# Patient Record
Sex: MALE | URBAN - METROPOLITAN AREA
[De-identification: names, ages, dates, MRNs, and addresses within clinical notes are randomized per-mention and may not be internally consistent; named-entity substitution may affect disease eponyms.]

---

## 2018-12-18 ENCOUNTER — OFFICE VISIT (OUTPATIENT)
Dept: URBAN - METROPOLITAN AREA CLINIC 76 | Facility: CLINIC | Age: 68
End: 2018-12-18
Payer: MEDICARE

## 2018-12-18 DIAGNOSIS — H35.3131 NONEXUDATIVE AGE-RELATED MACULAR DEGENERATION, BILATERAL, EARLY DRY STAGE: ICD-10-CM

## 2018-12-18 PROCEDURE — 99204 OFFICE O/P NEW MOD 45 MIN: CPT | Performed by: OPTOMETRIST

## 2018-12-18 ASSESSMENT — KERATOMETRY
OD: 43.00
OS: 43.25

## 2018-12-18 ASSESSMENT — INTRAOCULAR PRESSURE
OS: 12
OD: 14

## 2018-12-18 NOTE — IMPRESSION/PLAN
Impression: Nonexudative age-related macular degeneration, bilateral, early dry stage: H35.3131. OU. Plan: Discussed diagnosis in detail with patient. Counseling given about the benefits and/or risks of the Age-Related Eye Disease Study (AREDS) formulation for preventing progression of age-related macular degeneration (AMD). Add lutein 20-30 mg, zeaxanthin 2-5mg per day. Will continue to observe condition and or symptoms. Call if [address] worsens. Dispensed and explained use of [employer] grid.

## 2019-10-01 ENCOUNTER — OFFICE VISIT (OUTPATIENT)
Dept: URBAN - METROPOLITAN AREA CLINIC 76 | Facility: CLINIC | Age: 69
End: 2019-10-01
Payer: MEDICARE

## 2019-10-01 DIAGNOSIS — H25.13 AGE-RELATED NUCLEAR CATARACT, BILATERAL: Primary | ICD-10-CM

## 2019-10-01 PROCEDURE — 92004 COMPRE OPH EXAM NEW PT 1/>: CPT | Performed by: OPHTHALMOLOGY

## 2019-10-01 ASSESSMENT — INTRAOCULAR PRESSURE
OD: 14
OS: 15

## 2019-10-01 ASSESSMENT — KERATOMETRY
OS: 43.50
OD: 42.75

## 2019-10-01 NOTE — IMPRESSION/PLAN
Impression: Dry eye syndrome of bilateral lacrimal glands: H04.123. OU. Plan: Explained condition does not have a cure and will need artificial tears for maintenance.

## 2019-10-01 NOTE — IMPRESSION/PLAN
Impression: Open angle with borderline findings, low risk, bilateral: H40.013. OU. optic nerve appearance. IOP OU ok today. Plan: Discussed diagnosis in detail with patient. Recommend getting baseline glaucoma testing.

## 2019-12-31 ENCOUNTER — OFFICE VISIT (OUTPATIENT)
Dept: URBAN - METROPOLITAN AREA CLINIC 76 | Facility: CLINIC | Age: 69
End: 2019-12-31
Payer: MEDICARE

## 2019-12-31 DIAGNOSIS — H04.123 DRY EYE SYNDROME OF BILATERAL LACRIMAL GLANDS: ICD-10-CM

## 2019-12-31 PROCEDURE — 76514 ECHO EXAM OF EYE THICKNESS: CPT | Performed by: OPHTHALMOLOGY

## 2019-12-31 PROCEDURE — 99213 OFFICE O/P EST LOW 20 MIN: CPT | Performed by: OPHTHALMOLOGY

## 2019-12-31 PROCEDURE — 92083 EXTENDED VISUAL FIELD XM: CPT | Performed by: OPHTHALMOLOGY

## 2019-12-31 PROCEDURE — 92133 CPTRZD OPH DX IMG PST SGM ON: CPT | Performed by: OPHTHALMOLOGY

## 2019-12-31 ASSESSMENT — INTRAOCULAR PRESSURE
OD: 15
OS: 16

## 2019-12-31 NOTE — IMPRESSION/PLAN
Impression: Open angle with borderline findings, low risk, bilateral: H40.013. OU. optic nerve appearance. IOP OU ok today. Baseline VF and OCT testing reviewed Plan: Continue to monitor. No treatment recommended at this time.

## 2020-06-30 ENCOUNTER — OFFICE VISIT (OUTPATIENT)
Dept: URBAN - METROPOLITAN AREA CLINIC 76 | Facility: CLINIC | Age: 70
End: 2020-06-30
Payer: MEDICARE

## 2020-06-30 PROCEDURE — 99213 OFFICE O/P EST LOW 20 MIN: CPT | Performed by: OPHTHALMOLOGY

## 2020-06-30 ASSESSMENT — INTRAOCULAR PRESSURE
OS: 14
OD: 14

## 2020-06-30 NOTE — IMPRESSION/PLAN
Impression: Open angle with borderline findings, low risk, bilateral: H40.013. OU. optic nerve appearance. IOP OU ok today. Plan: Continue to monitor. No treatment recommended at this time. Needs updated tests.

## 2021-03-23 ENCOUNTER — OFFICE VISIT (OUTPATIENT)
Dept: URBAN - METROPOLITAN AREA CLINIC 76 | Facility: CLINIC | Age: 71
End: 2021-03-23
Payer: MEDICARE

## 2021-03-23 DIAGNOSIS — H43.813 VITREOUS DEGENERATION, BILATERAL: ICD-10-CM

## 2021-03-23 PROCEDURE — 99213 OFFICE O/P EST LOW 20 MIN: CPT | Performed by: OPTOMETRIST

## 2021-03-23 PROCEDURE — 92083 EXTENDED VISUAL FIELD XM: CPT | Performed by: OPTOMETRIST

## 2021-03-23 PROCEDURE — 92133 CPTRZD OPH DX IMG PST SGM ON: CPT | Performed by: OPTOMETRIST

## 2021-03-23 RX ORDER — LATANOPROST 50 UG/ML
0.005 % SOLUTION OPHTHALMIC
Qty: 2.5 | Refills: 0 | Status: INACTIVE
Start: 2021-03-23 | End: 2021-04-14

## 2021-03-23 ASSESSMENT — INTRAOCULAR PRESSURE
OS: 11
OD: 13

## 2021-03-23 NOTE — IMPRESSION/PLAN
Impression: Open angle with borderline findings, low risk, bilateral: H40.013. OU. optic nerve appearance. IOP OU ok today. VF shows normal OD and possible early nasal step OS. OCT shows mild NFL thinning inferior > superior, OS normal. Plan: Continue to monitor. Explained correlation between sleep apnea and glaucoma and strongly recommend pt get a sleep study. Will send letter to PCP to consider ordering sleep study. Start Latanoprost QHS OU.

## 2021-04-20 ENCOUNTER — OFFICE VISIT (OUTPATIENT)
Dept: URBAN - METROPOLITAN AREA CLINIC 76 | Facility: CLINIC | Age: 71
End: 2021-04-20
Payer: MEDICARE

## 2021-04-20 PROCEDURE — 99212 OFFICE O/P EST SF 10 MIN: CPT | Performed by: OPTOMETRIST

## 2021-04-20 ASSESSMENT — INTRAOCULAR PRESSURE
OD: 9
OS: 8

## 2021-04-20 NOTE — IMPRESSION/PLAN
Impression: Open angle with borderline findings, low risk, bilateral: H40.013. OU. optic nerve appearance. IOP OU good today. Plan: Continue to monitor. Continue Latanoprost QHS OU. Keep scheduled sleep study.

## 2021-08-26 ENCOUNTER — OFFICE VISIT (OUTPATIENT)
Dept: URBAN - METROPOLITAN AREA CLINIC 76 | Facility: CLINIC | Age: 71
End: 2021-08-26
Payer: MEDICARE

## 2021-08-26 DIAGNOSIS — H40.013 OPEN ANGLE WITH BORDERLINE FINDINGS, LOW RISK, BILATERAL: Primary | ICD-10-CM

## 2021-08-26 PROCEDURE — 99214 OFFICE O/P EST MOD 30 MIN: CPT | Performed by: OPTOMETRIST

## 2021-08-26 ASSESSMENT — INTRAOCULAR PRESSURE
OD: 10
OS: 9

## 2021-08-26 NOTE — IMPRESSION/PLAN
Impression: Open angle with borderline findings, low risk, bilateral: H40.013. OU. IOP  WNL/Stable OU. Pt had sleep study done, mild sleep apnea. Pt is currently using a CPAP machine  Plan: Continue to monitor. Continue Latanoprost QHS OU.  RTC 6 months DE/VF/OCT IOP

## 2022-03-02 ENCOUNTER — OFFICE VISIT (OUTPATIENT)
Dept: URBAN - METROPOLITAN AREA CLINIC 76 | Facility: CLINIC | Age: 72
End: 2022-03-02
Payer: MEDICARE

## 2022-03-02 PROCEDURE — 92083 EXTENDED VISUAL FIELD XM: CPT | Performed by: OPTOMETRIST

## 2022-03-02 PROCEDURE — 92133 CPTRZD OPH DX IMG PST SGM ON: CPT | Performed by: OPTOMETRIST

## 2022-03-02 PROCEDURE — 99214 OFFICE O/P EST MOD 30 MIN: CPT | Performed by: OPTOMETRIST

## 2022-03-02 ASSESSMENT — INTRAOCULAR PRESSURE
OD: 10
OS: 10

## 2022-03-02 NOTE — IMPRESSION/PLAN
Impression: Age-related nuclear cataract, bilateral: H25.13. OU. Bilateral. Plan: Discussed condition. Continue to monitor without treatment at this time.

## 2022-03-02 NOTE — IMPRESSION/PLAN
Impression: Open angle with borderline findings, low risk, bilateral: H40.013. OU. IOP  WNL/Stable OU. Pt had sleep study done, mild sleep apnea. Pt is currently using a CPAP machine. Preformed and reviewed VF and RNFL OCT. VF normal OU. RNFL OCT OD: mild NFL thinning sup > inf, stable; OS: mild NFL thinning sup and inf, stable. Bilateral. Plan: Discussed with patient Continue to monitor. Continue Latanoprost QHS OU.

## 2022-03-10 ENCOUNTER — OFFICE VISIT (OUTPATIENT)
Dept: URBAN - METROPOLITAN AREA CLINIC 76 | Facility: CLINIC | Age: 72
End: 2022-03-10

## 2022-03-10 DIAGNOSIS — H52.4 PRESBYOPIA: Primary | ICD-10-CM

## 2022-03-10 ASSESSMENT — VISUAL ACUITY
OD: 20/25
OS: 20/25

## 2022-03-10 ASSESSMENT — KERATOMETRY
OS: 43.88
OD: 43.63

## 2022-09-02 ENCOUNTER — OFFICE VISIT (OUTPATIENT)
Dept: URBAN - METROPOLITAN AREA CLINIC 76 | Facility: CLINIC | Age: 72
End: 2022-09-02
Payer: MEDICARE

## 2022-09-02 DIAGNOSIS — H40.013 OPEN ANGLE WITH BORDERLINE FINDINGS, LOW RISK, BILATERAL: Primary | ICD-10-CM

## 2022-09-02 DIAGNOSIS — H02.889 MEIBOMIAN GLAND DYSFUNCTION OF EYE: ICD-10-CM

## 2022-09-02 DIAGNOSIS — H25.13 AGE-RELATED NUCLEAR CATARACT, BILATERAL: ICD-10-CM

## 2022-09-02 PROCEDURE — 99213 OFFICE O/P EST LOW 20 MIN: CPT | Performed by: OPTOMETRIST

## 2022-09-02 ASSESSMENT — INTRAOCULAR PRESSURE
OS: 10
OD: 10

## 2022-09-02 NOTE — IMPRESSION/PLAN
Impression: Meibomian gland dysfunction of eye: H02.889 Bilateral. Plan: Discussed diagnosis in detail with patient. Warm compresses with lid massage from top / down, bottom / up, and sweep from inside / out x 2. Patient instructed to use emulsive base lubricant 3-4 x a day. Increase omega foods/nuts and/or Borage/Fish/Krill/Primrose oil supplement 1500-2500mg capsule orally per day.  Recommend Systane PF

## 2022-09-02 NOTE — IMPRESSION/PLAN
Impression: Open angle with borderline findings, low risk, bilateral: H40.013. OU. IOP  WNL/Stable OU. Pt had sleep study done, mild sleep apnea. Pt is stopped using a CPAP machine. Plan: Discussed with patient Continue to monitor. Continue Latanoprost QHS OU.

## 2023-03-10 ENCOUNTER — OFFICE VISIT (OUTPATIENT)
Dept: URBAN - METROPOLITAN AREA CLINIC 76 | Facility: CLINIC | Age: 73
End: 2023-03-10
Payer: MEDICARE

## 2023-03-10 DIAGNOSIS — H40.013 OPEN ANGLE WITH BORDERLINE FINDINGS, LOW RISK, BILATERAL: Primary | ICD-10-CM

## 2023-03-10 PROCEDURE — 92133 CPTRZD OPH DX IMG PST SGM ON: CPT | Performed by: OPTOMETRIST

## 2023-03-10 PROCEDURE — 92083 EXTENDED VISUAL FIELD XM: CPT | Performed by: OPTOMETRIST

## 2023-03-10 PROCEDURE — 99213 OFFICE O/P EST LOW 20 MIN: CPT | Performed by: OPTOMETRIST

## 2023-03-10 RX ORDER — BIMATOPROST 0.1 MG/ML
0.01 % SOLUTION/ DROPS OPHTHALMIC
Qty: 2.5 | Refills: 3 | Status: ACTIVE
Start: 2023-03-10

## 2023-03-10 ASSESSMENT — KERATOMETRY
OD: 42.88
OS: 43.38

## 2023-03-10 ASSESSMENT — INTRAOCULAR PRESSURE
OD: 11
OS: 11

## 2023-03-10 NOTE — IMPRESSION/PLAN
Impression: Open angle with borderline findings, low risk, bilateral: H40.013. Bilateral.
VF: possible early inferior nasal step OD WNL OS
OCT: mild NFL thinning sup OU Plan: Intraocular pressure well controlled, tolerating medications. VF changes OD today. Discussed sleep apnea possibly causing the progression. Pt has not been on CPAP lately and is willing to give it another try. Other option is adding medication. Dr. Bel Casas prefers both ideas. Also pt stated today he is in cardiac failure. Discussed sinusitis issues. Switch drop to Lumigan QHS OU.   D/C Latanoprost.

## 2023-03-31 ENCOUNTER — OFFICE VISIT (OUTPATIENT)
Dept: URBAN - METROPOLITAN AREA CLINIC 76 | Facility: CLINIC | Age: 73
End: 2023-03-31
Payer: MEDICARE

## 2023-03-31 DIAGNOSIS — H40.013 OPEN ANGLE WITH BORDERLINE FINDINGS, LOW RISK, BILATERAL: Primary | ICD-10-CM

## 2023-03-31 PROCEDURE — 99213 OFFICE O/P EST LOW 20 MIN: CPT | Performed by: OPTOMETRIST

## 2023-03-31 RX ORDER — BIMATOPROST 0.1 MG/ML
0.01 % SOLUTION/ DROPS OPHTHALMIC
Qty: 7.5 | Refills: 3 | Status: ACTIVE
Start: 2023-03-31

## 2023-03-31 ASSESSMENT — INTRAOCULAR PRESSURE
OD: 9
OS: 9

## 2023-03-31 NOTE — IMPRESSION/PLAN
Impression: Open angle with borderline findings, low risk, bilateral: H40.013 Bilateral.
IOP lowered on Lumigan OU Plan: Intraocular pressure well controlled, tolerating medications. Will continue with same regimen. Cont Lumigan QHS OU.